# Patient Record
Sex: FEMALE | Race: WHITE
[De-identification: names, ages, dates, MRNs, and addresses within clinical notes are randomized per-mention and may not be internally consistent; named-entity substitution may affect disease eponyms.]

---

## 2019-10-11 ENCOUNTER — HOSPITAL ENCOUNTER (EMERGENCY)
Dept: HOSPITAL 56 - MW.ED | Age: 44
Discharge: HOME | End: 2019-10-11
Payer: COMMERCIAL

## 2019-10-11 DIAGNOSIS — R06.2: Primary | ICD-10-CM

## 2019-10-11 DIAGNOSIS — N39.0: ICD-10-CM

## 2019-10-11 DIAGNOSIS — R05: ICD-10-CM

## 2019-10-11 DIAGNOSIS — F17.210: ICD-10-CM

## 2019-10-11 LAB
BUN SERPL-MCNC: 11 MG/DL (ref 7–18)
CHLORIDE SERPL-SCNC: 106 MMOL/L (ref 98–107)
CO2 SERPL-SCNC: 21.5 MMOL/L (ref 21–32)
GLUCOSE SERPL-MCNC: 116 MG/DL (ref 74–106)
POTASSIUM SERPL-SCNC: 3.5 MMOL/L (ref 3.5–5.1)
SODIUM SERPL-SCNC: 141 MMOL/L (ref 136–145)

## 2019-10-11 PROCEDURE — 84484 ASSAY OF TROPONIN QUANT: CPT

## 2019-10-11 PROCEDURE — 36415 COLL VENOUS BLD VENIPUNCTURE: CPT

## 2019-10-11 PROCEDURE — 93005 ELECTROCARDIOGRAM TRACING: CPT

## 2019-10-11 PROCEDURE — 87804 INFLUENZA ASSAY W/OPTIC: CPT

## 2019-10-11 PROCEDURE — 94640 AIRWAY INHALATION TREATMENT: CPT

## 2019-10-11 PROCEDURE — 96372 THER/PROPH/DIAG INJ SC/IM: CPT

## 2019-10-11 PROCEDURE — 71046 X-RAY EXAM CHEST 2 VIEWS: CPT

## 2019-10-11 PROCEDURE — 81001 URINALYSIS AUTO W/SCOPE: CPT

## 2019-10-11 PROCEDURE — 80053 COMPREHEN METABOLIC PANEL: CPT

## 2019-10-11 PROCEDURE — 99284 EMERGENCY DEPT VISIT MOD MDM: CPT

## 2019-10-11 PROCEDURE — 85025 COMPLETE CBC W/AUTO DIFF WBC: CPT

## 2019-10-11 PROCEDURE — 87086 URINE CULTURE/COLONY COUNT: CPT

## 2019-10-11 NOTE — CR
INDICATION: Cough x 1 wk, h/o smoking.



TECHNIQUE:



Chest 2 views.



COMPARISON:



None.



FINDINGS:



Cardiovascular and mediastinum:  Heart size and vasculature are normal in 

caliber and appearance.  Mediastinum is within normal limits.  



Lungs and pleural spaces:  Lungs are clear.  No sign of infiltrate or mass. 

 No sign of pleural effusion.  No pneumothorax.  



Bones and soft tissues:  No significant findings.  



IMPRESSION:



Unremarkable chest.



Dictated by: Joseph Retana MD @ 10/11/2019 17:24:20



(Electronically Signed)

## 2019-10-11 NOTE — EDM.PDOC
ED HPI GENERAL MEDICAL PROBLEM





- General


Chief Complaint: Respiratory Problem


Stated Complaint: COUGH


Time Seen by Provider: 10/11/19 16:11


Source of Information: Reports: Patient


History Limitations: Reports: No Limitations





- History of Present Illness


INITIAL COMMENTS - FREE TEXT/NARRATIVE: 


HISTORY AND PHYSICAL:





History of present illness:


Patient is a 44-year-old female who presents to the ED today with concern of 

cough 1 week. Patient states she does feel as if she is been having fevers and 

chills off-and-on but has not checked a temperature at home. Patient states she 

does smoke about a pack a day and has so for 20-30 years. Patient states the 

cough has been dry and she has not coughed up any substances. Patient denies 

any other symptoms or concerns. Patient denies any health history.





Patient denies fever, chills, chest pain, shortness of breath. Denies headache, 

neck stiff ness, change in vision, syncope, or near syncope. Denies nausea, 

vomiting, abdominal pain, diarrhea, constipation, or dysuria. Has not noted any 

blood in urine or stool. Patient has been eating and drinking appropriately.





Review of systems: 


As per history of present illness and below otherwise all systems reviewed and 

negative.





Past medical history: 


As per history of present illness and as reviewed below otherwise 

noncontributory.





Surgical history: 


As per history of present illness and as reviewed below otherwise 

noncontributory.





Social history: 


See social history for further information





Family history: 


As per history of present illness and as reviewed below otherwise 

noncontributory.





Physical exam:


General: Patient is alert, oriented, and in no acute distress. Patient sitting 

comfortably on exam table.


HEENT: Atraumatic, normocephalic, pupils equal and reactive bilaterally, 

negative for conjunctival pallor or scleral icterus, mucous membranes moist, 

TMs normal bilaterally, throat clear, neck supple, nontender, trachea midline. 

No drooling or trismus noted. No meningeal signs. No hot potato voice noted. 


Lungs: Diffuse wheezing to auscultation throughout all lung fields, breath 

sounds equal bilaterally, chest nontender. Dry cough elicited on exam.


Heart: S1S2, regular rate and rhythm without overt murmur


Abdomen: Soft, nondistended, nontender. Negative for masses or 

hepatosplenomegaly. Negative for costovertebral tenderness.


Pelvis: Stable nontender.


Genitourinary: Deferred.


Rectal: Deferred.


Skin: Intact, warm, dry. No lesions or rashes noted.


Extremities: Atraumatic, negative for cords or calf pain. Neurovascular 

unremarkable.


Neuro: Awake, alert, oriented. Cranial nerves II through XII unremarkable. 

Cerebellum unremarkable. Motor and sensory unremarkable throughout. Exam 

nonfocal.





Notes:


Discussed the importance for follow-up with a primary care provider.


Voices understanding and is agreeable to plan of care. Denies any further 

questions or concerns at this time.





Diagnostics:


CBC, CMP, troponin, UA, EKG, chest x-ray, influenza,





Therapeutics:


DuoNeb, Solu-Medrol





Prescription:


Medrol dose pack, Proair inhaler, DuoNeb, Macrobid





Impression: 


Cough with wheezing


Urinary Tract infection


Cigarette smoker





Plan:


1. Take medication as prescribed. You can alternate ibuprofen and Tylenol as 

checked her for pain and discomfort


2. Follow-up with your primary care provider as discussed. Return to the ED as 

needed and as discussed.








Definitive disposition and diagnosis as appropriate pending reevaluation and 

review of above.





  ** Head


Pain Score (Numeric/FACES): 8





- Related Data


 Allergies











Allergy/AdvReac Type Severity Reaction Status Date / Time


 


No Known Allergies Allergy   Verified 10/11/19 16:16











Home Meds: 


 Home Meds





. [No Known Home Meds]  10/11/19 [History]











Past Medical History





- Past Health History


Medical/Surgical History: Denies Medical/Surgical History





- Infectious Disease History


Infectious Disease History: Reports: None





Social & Family History





- Family History


Family Medical History: Noncontributory





- Tobacco Use


Smoking Status *Q: Current Every Day Smoker


Years of Tobacco use: 30


Packs/Tins Daily: 0.5





- Caffeine Use


Caffeine Use: Reports: Coffee





- Recreational Drug Use


Recreational Drug Use: Yes


Recreational Drug Type: Reports: Marijuana/Hashish


Recreational Drug Use Frequency: Socially





ED ROS GENERAL





- Review of Systems


Review Of Systems: ROS reveals no pertinent complaints other than HPI.





ED EXAM, GENERAL





- Physical Exam


Exam: See Below (See dictation)





Course





- Vital Signs


Last Recorded V/S: 


 Last Vital Signs











Temp  97.9 F   10/11/19 16:17


 


Pulse  117 H  10/11/19 16:17


 


Resp  18   10/11/19 16:17


 


BP  148/106 H  10/11/19 16:17


 


Pulse Ox  98   10/11/19 16:17














- Orders/Labs/Meds


Orders: 


 Active Orders 24 hr











 Category Date Time Status


 


 EKG Documentation Completion [RC] STAT Care  10/11/19 16:32 Active


 


 RT Aerosol Therapy [RC] ASDIRECTED Care  10/11/19 16:32 Active


 


 CULTURE URINE [RM] Stat Lab  10/11/19 17:20 Received











Labs: 


 Laboratory Tests











  10/11/19 10/11/19 10/11/19 Range/Units





  17:00 17:00 17:20 


 


WBC  10.79    (4.0-11.0)  K/uL


 


RBC  4.26 L    (4.30-5.90)  M/uL


 


Hgb  13.1    (12.0-16.0)  g/dL


 


Hct  38.7    (36.0-46.0)  %


 


MCV  90.8    (80.0-98.0)  fL


 


MCH  30.8    (27.0-32.0)  pg


 


MCHC  33.9    (31.0-37.0)  g/dL


 


RDW Std Deviation  48.1    (28.0-62.0)  fl


 


RDW Coeff of Perri  15    (11.0-15.0)  %


 


Plt Count  354    (150-400)  K/uL


 


MPV  10.30    (7.40-12.00)  fL


 


Neut % (Auto)  67.2    (48.0-80.0)  %


 


Lymph % (Auto)  26.8    (16.0-40.0)  %


 


Mono % (Auto)  5.2    (0.0-15.0)  %


 


Eos % (Auto)  0.6    (0.0-7.0)  %


 


Baso % (Auto)  0.2    (0.0-1.5)  %


 


Neut # (Auto)  7.3 H    (1.4-5.7)  K/uL


 


Lymph # (Auto)  2.9 H    (0.6-2.4)  K/uL


 


Mono # (Auto)  0.6    (0.0-0.8)  K/uL


 


Eos # (Auto)  0.1    (0.0-0.7)  K/uL


 


Baso # (Auto)  0.0    (0.0-0.1)  K/uL


 


Nucleated RBC %  0.0    /100WBC


 


Nucleated RBCs #  0    K/uL


 


Sodium   141   (136-145)  mmol/L


 


Potassium   3.5   (3.5-5.1)  mmol/L


 


Chloride   106   ()  mmol/L


 


Carbon Dioxide   21.5   (21.0-32.0)  mmol/L


 


BUN   11   (7.0-18.0)  mg/dL


 


Creatinine   0.7   (0.6-1.0)  mg/dL


 


Est Cr Clr Drug Dosing   98.01   mL/min


 


Estimated GFR (MDRD)   > 60.0   ml/min


 


Glucose   116 H   ()  mg/dL


 


Calcium   9.0   (8.5-10.1)  mg/dL


 


Total Bilirubin   0.4   (0.2-1.0)  mg/dL


 


AST   12 L   (15-37)  IU/L


 


ALT   18   (14-63)  IU/L


 


Alkaline Phosphatase   83   ()  U/L


 


Troponin I   < 0.050   (0.000-0.056)  ng/mL


 


Total Protein   7.9   (6.4-8.2)  g/dL


 


Albumin   3.4   (3.4-5.0)  g/dL


 


Globulin   4.5 H   (2.6-4.0)  g/dL


 


Albumin/Globulin Ratio   0.8 L   (0.9-1.6)  


 


Urine Color    YELLOW  


 


Urine Appearance    HAZY  


 


Urine pH    5.5  (5.0-8.0)  


 


Ur Specific Gravity    >= 1.030  (1.001-1.035)  


 


Urine Protein    TRACE H  (NEGATIVE)  mg/dL


 


Urine Glucose (UA)    NEGATIVE  (NEGATIVE)  mg/dL


 


Urine Ketones    15 H  (NEGATIVE)  mg/dL


 


Urine Occult Blood    NEGATIVE  (NEGATIVE)  


 


Urine Nitrite    NEGATIVE  (NEGATIVE)  


 


Urine Bilirubin    SMALL H  (NEGATIVE)  


 


Urine Ictotest    NEGATIVE  


 


Urine Urobilinogen    0.2  (<2.0)  EU/dL


 


Ur Leukocyte Esterase    TRACE H  (NEGATIVE)  


 


Urine RBC    0-2  (0-2/HPF)  


 


Urine WBC    8-10  (0-5/HPF)  


 


Ur Epithelial Cells    MANY  (NONE-FEW)  


 


Calcium Oxalate Crystal    FEW  (NEGATIVE)  


 


Urine Bacteria    FEW  (NEGATIVE)  


 


Urine Mucus    MODERATE  (NONE-MOD)  











Meds: 


Medications














Discontinued Medications














Generic Name Dose Route Start Last Admin





  Trade Name Freq  PRN Reason Stop Dose Admin


 


Albuterol/Ipratropium  3 ml  10/11/19 16:32  10/11/19 17:06





  Duoneb 3.0-0.5 Mg/3 Ml  NEB  10/11/19 16:33  3 ml





  ONETIME ONE   Administration





     





     





     





     


 


Methylprednisolone Sodium Succinate  125 mg  10/11/19 16:32  10/11/19 17:24





  Solu-Medrol  IM  10/11/19 16:33  125 mg





  ONETIME ONE   Administration





     





     





     





     














Departure





- Departure


Time of Disposition: 18:09


Disposition: Home, Self-Care 01


Clinical Impression: 


 Cough, Wheezing, Cigarette smoker





Urinary tract infection


Qualifiers:


 Urinary tract infection type: acute cystitis Hematuria presence: with 

hematuria Qualified Code(s): N30.01 - Acute cystitis with hematuria








- Discharge Information


Referrals: 


PCP,None [Primary Care Provider] - 


Forms:  ED Department Discharge


Additional Instructions: 


The following information is given to patients seen in the emergency department 

who are being discharged to home. This information is to outline your options 

for follow-up care. We provide all patients seen in our emergency department 

with a follow-up referral.





The need for follow-up, as well as the timing and circumstances, are variable 

depending upon the specifics of your emergency department visit.





If you don't have a primary care physician on staff, we will provide you with a 

referral. We always advise you to contact your personal physician following an 

emergency department visit to inform them of the circumstance of the visit and 

for follow-up with them and/or the need for any referrals to a consulting 

specialist.





The emergency department will also refer you to a specialist when appropriate. 

This referral assures that you have the opportunity for follow-up care with a 

specialist. All of these measure are taken in an effort to provide you with 

optimal care, which includes your follow-up.





Under all circumstances we always encourage you to contact your private 

physician who remains a resource for coordinating your care. When calling for 

follow-up care, please make the office aware that this follow-up is from your 

recent emergency room visit. If for any reason you are refused follow-up, 

please contact the [facility] Emergency 

Department at [phone] and asked to speak to the emergency department 

charge nurse.





[facility]


Primary Care


[address] [place] [address]


Phone: [phone]


Fax: [phone]





[address] [address]


Phone: [phone]


Fax: [phone]





1. Take medication as prescribed. You can alternate ibuprofen and Tylenol as 

checked her for pain and discomfort


2. Follow-up with your primary care provider as discussed. Return to the ED as 

needed and as discussed.








 








- My Orders


Last 24 Hours: 


My Active Orders





10/11/19 16:32


EKG Documentation Completion [RC] STAT 


RT Aerosol Therapy [RC] ASDIRECTED 





10/11/19 17:20


CULTURE URINE [RM] Stat 














- Assessment/Plan


Last 24 Hours: 


My Active Orders





10/11/19 16:32


EKG Documentation Completion [RC] STAT 


RT Aerosol Therapy [RC] ASDIRECTED 





10/11/19 17:20


CULTURE URINE [RM] Stat

## 2020-04-20 NOTE — CR
Indication:



Pain after fall 



Technique:



Frontal view pelvis frontal and lateral left hip



Comparison:



None 



Findings:



Bones: There is an anterior dislocation of the left femur. No fracture 

identified. 



Joint spaces: Unremarkable.  



Soft tissues: Unremarkable.  



Impression:



Anterior dislocation of the left femur. No fracture identified.



Dictated by Kasandra eDmpsey MD @ Apr 20 2020  4:04AM



Signed by Dr. Kasandra Dempsey @ Apr 20 2020  4:04AM

## 2020-04-20 NOTE — PCM.PRNOTE
- Free Text/Narrative


Note: 





Anes Note





I was called to ER to provide conscious sedation for dislocated left hip. 





O2 nasak cannula on. VS stable. 





100 mg propofol given in slow divided doses to achieve a state of conscious 

sedation. 





Hip was easily reduced by Er physician single attempt.





Procedure and anesthesia tolerated very well  by patient. 





Patient was awake and stable when anesthesia was over.





Time with patient [id number]





Mayo Love CRNA

## 2020-04-20 NOTE — PCM.PREANE
Preanesthetic Assessment





- Anesthesia/Transfusion/Family Hx


Anesthesia History: Prior Anesthesia Without Reaction


Family History of Anesthesia Reaction: No





- Physical Assessment


NPO Status Date: 04/19/20


NPO Status Time: 19:00


Vital Signs: 





 Last Vital Signs











Temp  36.0 C L  04/20/20 03:07


 


Pulse  69   04/20/20 03:59


 


Resp  17   04/20/20 03:59


 


BP  142/93 H  04/20/20 03:59


 


Pulse Ox  100   04/20/20 03:59











Height: 1.68 m


Weight: 99.79 kg


ASA Class: 1E





- Allergies


Allergies/Adverse Reactions: 


 Allergies











Allergy/AdvReac Type Severity Reaction Status Date / Time


 


No Known Allergies Allergy   Verified 04/20/20 03:09














- Acknowledgements


Anesthesia Type Planned: MAC


Pt an Appropriate Candidate for the Planned Anesthesia: Yes


Alternatives and Risks of Anesthesia Discussed w Pt/Guardian: Yes


Pt/Guardian Understands and Agrees with Anesthesia Plan: Yes


Additional Comments: 





Plan conscious sedation in ER to reduce a dislocated left hip





PreAnesthesia Questionnaire





- Past Health History


Medical/Surgical History: Denies Medical/Surgical History


Dermatologic History: Reports: Other (See Below)


Other Dermatologic History: infection in L breast- had surgery for





- Infectious Disease History


Infectious Disease History: Reports: None





- SUBSTANCE USE


Smoking Status *Q: Current Every Day Smoker


Tobacco Use Within Last Twelve Months: Cigarettes


Recreational Drug Use History: Yes


Recreational Drug Type: Reports: Marijuana/Hashish





- HOME MEDS


Home Medications: 


 Home Meds





. [No Known Home Meds]  10/11/19 [History]











- CURRENT (IN HOUSE) MEDS


Current Meds: 





 Current Medications








Discontinued Medications





Ketorolac Tromethamine (Toradol)  15 mg IVPUSH ONETIME ONE


   Stop: 04/20/20 03:41


   Last Admin: 04/20/20 03:47 Dose:  15 mg


Morphine Sulfate (Morphine)  6 mg IVPUSH ONETIME ONE


   Stop: 04/20/20 03:41


   Last Admin: 04/20/20 03:48 Dose:  6 mg


Ondansetron HCl (Zofran)  4 mg IVPUSH ONETIME ONE


   Stop: 04/20/20 03:41


   Last Admin: 04/20/20 03:45 Dose:  4 mg


Propofol (Diprivan  20 Ml) Confirm Administered Dose 200 mg .ROUTE .STK-MED ONE


   Stop: 04/20/20 04:06

## 2020-04-20 NOTE — CR
EXAM DATE: 20



PATIENT'S AGE: 45





Patient: FRANCY BASHIR



Facility: [facility] Patient ID: [id number]

Site Patient ID: [id number].

Site Accession #: [id number].

: 1975

Study: XRay-Hip Left hip-2020 4:36:08 AM

Ordering Physician: joe

Final Report: 

Indication:

Post reduction 



Technique:

Frontal view left hip



Comparison:

Same date at 3:26 a.m.



Findings/impression:

Interval reduction of a left hip dislocation. Alignment appears anatomic on 
this frontal projection. No fracture identified.





Dictated by Kasandra Dempsey MD @ 2020 4:46AM

Signed by: Kasandra Dempsey MD @2020 4:46:27 AM

(Electronic Signature)





Report Signed by Proxy.
JENNIFER

## 2020-04-20 NOTE — EDM.PDOC
ED [facility] GENERAL MEDICAL PROBLEM





- General


Chief Complaint: Lower Extremity Injury/Pain


Stated Complaint: RT LEG HURTS


Time Seen by Provider: 04/20/20 03:10


Source of Information: Reports: Patient


History Limitations: Reports: No Limitations





- History of Present Illness


INITIAL COMMENTS - FREE TEXT/NARRATIVE: 


Patient is 45-year-old female with past medical history of smoking presenting 

with a chief complaint of left hip pain.  Patient states that she was wrestling 

with her significant other prior to arrival when she felt a pop and had 

immediate pain to her left hip.  Pain does not radiate.  Pain is made worse 

with attempts to move.  Patient received 100 mcg of fentanyl from EMS without 

any improvement in pain.  Patient denies any other injuries.  Patient denies 

any drugs or alcohol tonight.





Pmhx: None


Pshx: None


Family Hx: noncontributory 





Smoking history?  Yes


Etoh use? none


Drug use? none





Conference review of systems performed and otherwise negative as per HPI





I have reviewed the triage vital signs


Const: Well nourished, well developed, appears stated age


Eyes: PERRL, no conjunctival injection


HENT: NCAT, Neck supple without meningismus 


CV: RRR, Warm, well-perfused extremities


RESP: CTAB, Unlabored respiratory effort


GI: soft, non-tender, non-distended, no masses


MSK: Left lower extremity is externally rotated and slightly shortened.  No 

gross deformities appreciated


Skin: Warm, dry. No rashes


Neuro: Alert, CNs II-XII grossly intact. Sensation and motor function of 

extremities grossly intact.


Psych: Appropriate mood and affect





Assessment and plan:


Patient 45-year-old female presenting with left-sided hip pain status post 

injury.  On x-ray patient had evidence of an anterior hip dislocation.  No 

evidence of fracture on x-ray.  No other injuries noted on physical exam.  

Patient was neurovascularly intact pre-and post procedure.  Patient had 

successful reduction of her hip in the emergency department.  Patient was 

placed in a knee immobilizer and given orthopedic follow-up.  No immediate 

complications.  All questions addressed and answered.  Patient agrees with plan.








Procedure note: Dislocation reduction


Indication: Hip dislocation


: Dr. Valle


Location: Left hip


Indications, risks, and benefits explained to patient and informed consent 

obtained. 


Pre-procedure neurovascular exam: Intact


A time out was performed.


Procedural sedation was used and once patient was appropriately sedated, the 

patient was placed in the supine position.  Patient's hip was flexed to 90 

degrees and abduction force was applied until hip was successfully reduced.  

Palpable clunk was felt on first attempt.  Patient was placed immediately in a 

knee immobilizer


Post procedure neurovascular exam: Intact


The patient tolerated the procedure well with no immediate complications








  ** Left Hip


Pain Score (Numeric/FACES): 10





- Related Data


 Allergies











Allergy/AdvReac Type Severity Reaction Status Date / Time


 


No Known Allergies Allergy   Verified 04/20/20 03:09











Home Meds: 


 Home Meds





. [No Known Home Meds]  10/11/19 [History]











Past Medical History





- Past Health History


Medical/Surgical History: Denies Medical/Surgical History





- Infectious Disease History


Infectious Disease History: Reports: None





Social & Family History





- Family History


Family Medical History: Noncontributory





- Tobacco Use


Smoking Status *Q: Current Every Day Smoker


Years of Tobacco use: 25


Packs/Tins Daily: 1





- Caffeine Use


Caffeine Use: Reports: Coffee





- Recreational Drug Use


Recreational Drug Use: Yes


Drug Use in Last 12 Months: Yes


Recreational Drug Type: Reports: Marijuana/Hashish


Recreational Drug Use Frequency: Socially





Review of Systems





- Review of Systems


Review Of Systems: See Below





ED EXAM, GENERAL





- Physical Exam


Exam: See Below





Course





- Vital Signs


Last Recorded V/S: 


 Last Vital Signs











Temp  36.0 C L  04/20/20 03:07


 


Pulse  69   04/20/20 03:59


 


Resp  17   04/20/20 03:59


 


BP  142/93 H  04/20/20 03:59


 


Pulse Ox  100   04/20/20 03:59














- Orders/Labs/Meds


Orders: 


 Active Orders 24 hr











 Category Date Time Status


 


 Immobilizer [RC] ASDIRECTED Care  04/20/20 04:19 Active


 


 Hip Min 1V Lt [CR] Stat Exams  04/20/20 04:18 Taken


 


 Hip Min 2V or 3V w Pelvis Lt [CR] Stat Exams  04/20/20 03:09 Taken


 


 DME for Prescription [COMM] Stat Oth  04/20/20 04:42 Ordered











Meds: 


Medications














Discontinued Medications














Generic Name Dose Route Start Last Admin





  Trade Name Freq  PRN Reason Stop Dose Admin


 


Ketorolac Tromethamine  15 mg  04/20/20 03:40  04/20/20 03:47





  Toradol  IVPUSH  04/20/20 03:41  15 mg





  ONETIME ONE   Administration





     





     





     





     


 


Morphine Sulfate  6 mg  04/20/20 03:40  04/20/20 03:48





  Morphine  IVPUSH  04/20/20 03:41  6 mg





  ONETIME ONE   Administration





     





     





     





     


 


Ondansetron HCl  4 mg  04/20/20 03:40  04/20/20 03:45





  Zofran  IVPUSH  04/20/20 03:41  4 mg





  ONETIME ONE   Administration





     





     





     





     


 


Propofol  Confirm  04/20/20 04:05  





  Diprivan  20 Ml  Administered  04/20/20 04:06  





  Dose   





  200 mg   





  .ROUTE   





  .STK-MED ONE   





     





     





     





     














Departure





- Departure


Time of Disposition: 04:43


Disposition: Home, Self-Care 01


Clinical Impression: 


 Hip dislocation, left








- Discharge Information


Instructions:  Hip Dislocation


Referrals: 


PCP,None [Primary Care Provider] - 


Forms:  ED Department Discharge


Additional Instructions: 


The following information is given to patients seen in the emergency department 

who are being discharged to home. This information is to outline your options 

for follow-up care. We provide all patients seen in our emergency department 

with a follow-up referral.





The need for follow-up, as well as the timing and circumstances, are variable 

depending upon the specifics of your emergency department visit.





If you don't have a primary care physician on staff, we will provide you with a 

referral. We always advise you to contact your personal physician following an 

emergency department visit to inform them of the circumstance of the visit and 

for follow-up with them and/or the need for any referrals to a consulting 

specialist.





The emergency department will also refer you to a specialist when appropriate. 

This referral assures that you have the opportunity for follow-up care with a 

specialist. All of these measure are taken in an effort to provide you with 

optimal care, which includes your follow-up.





Under all circumstances we always encourage you to contact your private 

physician who remains a resource for coordinating your care. When calling for 

follow-up care, please make the office aware that this follow-up is from your 

recent emergency room visit. If for any reason you are refused follow-up, 

please contact the [facility] Emergency 

Department at [phone] and asked to speak to the emergency department 

charge nurse.








Sepsis Event Note





- Evaluation


Sepsis Screening Result: No Definite Risk





- Focused Exam


Vital Signs: 


 Vital Signs











  Temp Pulse Resp BP Pulse Ox


 


 04/20/20 03:59   69  17  142/93 H  100


 


 04/20/20 03:07  36.0 C L  79  26 H  145/97 H  100











Date Exam was Performed: 04/20/20


Time Exam was Performed: 04:43





- My Orders


Last 24 Hours: 


My Active Orders





04/20/20 03:09


Hip Min 2V or 3V w Pelvis Lt [CR] Stat 





04/20/20 04:18


Hip Min 1V Lt [CR] Stat 





04/20/20 04:19


Immobilizer [RC] ASDIRECTED 





04/20/20 04:42


DME for Prescription [COMM] Stat 














- Assessment/Plan


Last 24 Hours: 


My Active Orders





04/20/20 03:09


Hip Min 2V or 3V w Pelvis Lt [CR] Stat 





04/20/20 04:18


Hip Min 1V Lt [CR] Stat 





04/20/20 04:19


Immobilizer [RC] ASDIRECTED 





04/20/20 04:42


DME for Prescription [COMM] Stat